# Patient Record
(demographics unavailable — no encounter records)

---

## 2018-04-22 NOTE — PDOC
History of Present Illness





- General


Chief Complaint: Vomiting/Diarrhea


Stated Complaint: WEAKNESS


Time Seen by Provider: 04/22/18 15:57


History Source: Parent(s)


Exam Limitations: No Limitations





- History of Present Illness


Initial Comments: 





04/22/18 16:10


CHIEF COMPLAINT: Vomiting, diarrhea, tactile fever since yesterday.





HISTORY OF PRESENT ILLNESS: Patient is a 3 year 8-month-old male, full-term well

-nourished well-developed patient with vomiting yesterday, diarrhea today, 

tactile fever. Patient is tolerating by mouth, 2 wet diapers today. Patient has 

been wearing diapers because stool has been loose.  





Birth history: Delivered at 37 weeks, no O2 or NICU stay required.





Past Medical History: See nursing note,





Family History: Otherwise not significant





Social History: Otherwise not significant





REVIEW OF SYSTEMS: 


GENERAL/CONSTITUTIONAL: Fever. No weakness. No weight change.


HEAD, EYES, EARS, NOSE AND THROAT: No change in vision. No ear pain or 

discharge. No sore throat. 


CARDIOVASCULAR: No chest pain or shortness of breath.


RESPIRATORY: No cough, no wheezing


GASTROINTESTINAL: No diarrhea or constipation. 


GENITOURINARY: No dysuria, frequency, or change in urination.


MUSCULOSKELETAL: No joint or muscle swelling or pain. No neck or back pain.


SKIN: No rash or lesions 


NEUROLOGIC: No headache.


HEMATOLOGIC/LYMPHATIC: No lymphadenopathy


ALLERGIC/IMMUNOLOGIC: No hives or skin allergy. No latex allergy.





PHYSICAL EXAM:


GENERAL: The child is awake, alert, and appropriately interactive.


EYES: The pupils are equal, round, and reactive to light, with clear, 

conjunctiva. Tears with crying


NOSE: The nose is clear without discharge.


EARS: The ear canals and tympanic membranes are normal.


THROAT: The oropharynx is erythematous without exudates. No oral lesions . The 

mucous membranes are moist.


NECK: The neck is supple without adenopathy or meningismus.


CHEST: The lungs are clear without wheezes or rhonchi.


HEART: Heart is regular rhythm, with normal S1 and S2, no murmurs.


ABDOMEN: The abdomen is soft and nontender with normal bowel sounds. There is 

no organomegaly and no mass. There is no guarding or rebound.


GENITALIA: Uncircumcised


EXTREMITIES: Extremities are normal.


NEURO: Behavior is normal for age. Tone is normal.


SKIN: No rash , lesions or petechie. 


04/22/18 16:13








Past History





- Past Medical History


Allergies/Adverse Reactions: 


 Allergies











Allergy/AdvReac Type Severity Reaction Status Date / Time


 


No Known Allergies Allergy   Verified 04/22/18 15:34











Home Medications: 


Ambulatory Orders





Ondansetron Oral Solution [Zofran Oral Solution -] 2 mg PO TID #30 ml 04/22/18 











- Suicide/Smoking/Psychosocial Hx


Smoking History: Never smoked


Have you smoked in the past 12 months: No


Information on smoking cessation initiated: No


Hx Alcohol Use: No


Drug/Substance Use Hx: No





*Physical Exam





- Vital Signs


 Last Vital Signs











Temp Pulse Resp BP Pulse Ox


 


 100.1 F H  123 H  26   96/54   98 


 


 04/22/18 15:32  04/22/18 15:32  04/22/18 15:32  04/22/18 15:32  04/22/18 15:32














Medical Decision Making





- Medical Decision Making





04/22/18 16:12


A/P:. With vomiting, diarrhea and fever. No medication given prior to arrival. 

Patient does have tears with crying has had 2 wet diapers today 1 diarrhea 

bowel movement. Zofran given, rapid strep sent.


04/22/18 17:40


Patient is active and playful, eating and drinking without difficulty. Refusing 

to give me a urine specimen. If patient tolerates by mouth, will DC patient 

home with strict instructions to follow-up with pediatrician tomorrow.  Rapid 

strep is negative. Patient is well-appearing, nonseptic, no fever, active and 

playful. Eating doritos without difficulty.   


04/22/18 18:21








*DC/Admit/Observation/Transfer


Diagnosis at time of Disposition: 


 Viral gastroenteritis








- Discharge Dispostion


Disposition: HOME


Condition at time of disposition: Stable


Admit: No





- Prescriptions


Prescriptions: 


Ondansetron Oral Solution [Zofran Oral Solution -] 2 mg PO TID #30 ml





- Referrals


Referrals: 


Vidya Patel [Primary Care Provider] - 





- Patient Instructions


Printed Discharge Instructions:  [place] Diet


Additional Instructions: 


Please increase fluid intake, Gatorade, Pedialyte


[place] diet, bread, rice, toast, no fried foods, no milk products, no citrus 

food or acidic food\


If fever, vomiting, diarrhea tomorrow follow-up with pediatrician,  Because we 

were unable to obtain a urine specimen, you have to follow up tomorrow if fever 

or vomiting persist.  


Print Language: [place]





- Post Discharge Activity